# Patient Record
Sex: FEMALE | Race: WHITE | NOT HISPANIC OR LATINO
[De-identification: names, ages, dates, MRNs, and addresses within clinical notes are randomized per-mention and may not be internally consistent; named-entity substitution may affect disease eponyms.]

---

## 2022-06-28 PROBLEM — Z00.00 ENCOUNTER FOR PREVENTIVE HEALTH EXAMINATION: Status: ACTIVE | Noted: 2022-06-28

## 2022-07-15 ENCOUNTER — APPOINTMENT (OUTPATIENT)
Dept: MATERNAL FETAL MEDICINE | Facility: CLINIC | Age: 37
End: 2022-07-15

## 2022-07-28 ENCOUNTER — APPOINTMENT (OUTPATIENT)
Dept: MATERNAL FETAL MEDICINE | Facility: CLINIC | Age: 37
End: 2022-07-28

## 2022-07-28 ENCOUNTER — ASOB RESULT (OUTPATIENT)
Age: 37
End: 2022-07-28

## 2022-07-28 PROCEDURE — 99203 OFFICE O/P NEW LOW 30 MIN: CPT | Mod: 95

## 2022-07-29 ENCOUNTER — APPOINTMENT (OUTPATIENT)
Dept: ANTEPARTUM | Facility: CLINIC | Age: 37
End: 2022-07-29

## 2022-07-29 ENCOUNTER — ASOB RESULT (OUTPATIENT)
Age: 37
End: 2022-07-29

## 2022-07-29 PROCEDURE — 76811 OB US DETAILED SNGL FETUS: CPT

## 2022-08-29 ENCOUNTER — ASOB RESULT (OUTPATIENT)
Age: 37
End: 2022-08-29

## 2022-08-29 ENCOUNTER — APPOINTMENT (OUTPATIENT)
Dept: ANTEPARTUM | Facility: CLINIC | Age: 37
End: 2022-08-29

## 2022-08-29 PROCEDURE — 76819 FETAL BIOPHYS PROFIL W/O NST: CPT

## 2022-08-29 PROCEDURE — 76816 OB US FOLLOW-UP PER FETUS: CPT

## 2022-09-12 ENCOUNTER — APPOINTMENT (OUTPATIENT)
Dept: ANTEPARTUM | Facility: CLINIC | Age: 37
End: 2022-09-12

## 2022-09-16 ENCOUNTER — ASOB RESULT (OUTPATIENT)
Age: 37
End: 2022-09-16

## 2022-09-16 ENCOUNTER — APPOINTMENT (OUTPATIENT)
Dept: ANTEPARTUM | Facility: CLINIC | Age: 37
End: 2022-09-16

## 2022-09-16 PROCEDURE — 76816 OB US FOLLOW-UP PER FETUS: CPT

## 2022-09-16 PROCEDURE — 76819 FETAL BIOPHYS PROFIL W/O NST: CPT | Mod: 59

## 2022-10-14 ENCOUNTER — ASOB RESULT (OUTPATIENT)
Age: 37
End: 2022-10-14

## 2022-10-14 ENCOUNTER — APPOINTMENT (OUTPATIENT)
Dept: ANTEPARTUM | Facility: CLINIC | Age: 37
End: 2022-10-14

## 2022-10-14 PROCEDURE — 76816 OB US FOLLOW-UP PER FETUS: CPT

## 2022-11-08 ENCOUNTER — APPOINTMENT (OUTPATIENT)
Dept: ANTEPARTUM | Facility: CLINIC | Age: 37
End: 2022-11-08

## 2022-11-08 ENCOUNTER — ASOB RESULT (OUTPATIENT)
Age: 37
End: 2022-11-08

## 2022-11-08 PROCEDURE — 76816 OB US FOLLOW-UP PER FETUS: CPT

## 2022-11-08 PROCEDURE — 76819 FETAL BIOPHYS PROFIL W/O NST: CPT | Mod: 59

## 2022-11-11 ENCOUNTER — ASOB RESULT (OUTPATIENT)
Age: 37
End: 2022-11-11

## 2022-11-11 ENCOUNTER — APPOINTMENT (OUTPATIENT)
Dept: ANTEPARTUM | Facility: CLINIC | Age: 37
End: 2022-11-11

## 2022-11-11 ENCOUNTER — INPATIENT (INPATIENT)
Facility: HOSPITAL | Age: 37
LOS: 2 days | Discharge: HOME | End: 2022-11-14
Attending: SPECIALIST | Admitting: SPECIALIST

## 2022-11-11 VITALS
TEMPERATURE: 99 F | HEART RATE: 121 BPM | SYSTOLIC BLOOD PRESSURE: 144 MMHG | DIASTOLIC BLOOD PRESSURE: 83 MMHG | RESPIRATION RATE: 16 BRPM

## 2022-11-11 DIAGNOSIS — O41.00X0 OLIGOHYDRAMNIOS, UNSPECIFIED TRIMESTER, NOT APPLICABLE OR UNSPECIFIED: ICD-10-CM

## 2022-11-11 LAB
ALBUMIN SERPL ELPH-MCNC: 3.5 G/DL — SIGNIFICANT CHANGE UP (ref 3.3–5)
ALP SERPL-CCNC: 136 U/L — HIGH (ref 40–120)
ALT FLD-CCNC: 13 U/L — SIGNIFICANT CHANGE UP (ref 4–33)
ANION GAP SERPL CALC-SCNC: 13 MMOL/L — SIGNIFICANT CHANGE UP (ref 7–14)
APPEARANCE UR: CLEAR — SIGNIFICANT CHANGE UP
APTT BLD: 25.9 SEC — LOW (ref 27–36.3)
AST SERPL-CCNC: 18 U/L — SIGNIFICANT CHANGE UP (ref 4–32)
BASOPHILS # BLD AUTO: 0.04 K/UL — SIGNIFICANT CHANGE UP (ref 0–0.2)
BASOPHILS NFR BLD AUTO: 0.3 % — SIGNIFICANT CHANGE UP (ref 0–2)
BILIRUB SERPL-MCNC: <0.2 MG/DL — SIGNIFICANT CHANGE UP (ref 0.2–1.2)
BILIRUB UR-MCNC: NEGATIVE — SIGNIFICANT CHANGE UP
BLD GP AB SCN SERPL QL: NEGATIVE — SIGNIFICANT CHANGE UP
BUN SERPL-MCNC: 11 MG/DL — SIGNIFICANT CHANGE UP (ref 7–23)
CALCIUM SERPL-MCNC: 10 MG/DL — SIGNIFICANT CHANGE UP (ref 8.4–10.5)
CHLORIDE SERPL-SCNC: 103 MMOL/L — SIGNIFICANT CHANGE UP (ref 98–107)
CO2 SERPL-SCNC: 20 MMOL/L — LOW (ref 22–31)
COLOR SPEC: YELLOW — SIGNIFICANT CHANGE UP
CREAT ?TM UR-MCNC: 88 MG/DL — SIGNIFICANT CHANGE UP
CREAT SERPL-MCNC: 0.48 MG/DL — LOW (ref 0.5–1.3)
DIFF PNL FLD: ABNORMAL
EGFR: 127 ML/MIN/1.73M2 — SIGNIFICANT CHANGE UP
EOSINOPHIL # BLD AUTO: 0.05 K/UL — SIGNIFICANT CHANGE UP (ref 0–0.5)
EOSINOPHIL NFR BLD AUTO: 0.4 % — SIGNIFICANT CHANGE UP (ref 0–6)
GLUCOSE SERPL-MCNC: 112 MG/DL — HIGH (ref 70–99)
GLUCOSE UR QL: NEGATIVE — SIGNIFICANT CHANGE UP
HCT VFR BLD CALC: 36.1 % — SIGNIFICANT CHANGE UP (ref 34.5–45)
HGB BLD-MCNC: 12.1 G/DL — SIGNIFICANT CHANGE UP (ref 11.5–15.5)
IANC: 10.73 K/UL — HIGH (ref 1.8–7.4)
IMM GRANULOCYTES NFR BLD AUTO: 2.1 % — HIGH (ref 0–0.9)
INR BLD: 0.91 RATIO — SIGNIFICANT CHANGE UP (ref 0.88–1.16)
KETONES UR-MCNC: NEGATIVE — SIGNIFICANT CHANGE UP
LDH SERPL L TO P-CCNC: 156 U/L — SIGNIFICANT CHANGE UP (ref 135–225)
LEUKOCYTE ESTERASE UR-ACNC: NEGATIVE — SIGNIFICANT CHANGE UP
LYMPHOCYTES # BLD AUTO: 1.3 K/UL — SIGNIFICANT CHANGE UP (ref 1–3.3)
LYMPHOCYTES # BLD AUTO: 9.8 % — LOW (ref 13–44)
MCHC RBC-ENTMCNC: 32.1 PG — SIGNIFICANT CHANGE UP (ref 27–34)
MCHC RBC-ENTMCNC: 33.5 GM/DL — SIGNIFICANT CHANGE UP (ref 32–36)
MCV RBC AUTO: 95.8 FL — SIGNIFICANT CHANGE UP (ref 80–100)
MONOCYTES # BLD AUTO: 0.84 K/UL — SIGNIFICANT CHANGE UP (ref 0–0.9)
MONOCYTES NFR BLD AUTO: 6.3 % — SIGNIFICANT CHANGE UP (ref 2–14)
NEUTROPHILS # BLD AUTO: 10.73 K/UL — HIGH (ref 1.8–7.4)
NEUTROPHILS NFR BLD AUTO: 81.1 % — HIGH (ref 43–77)
NITRITE UR-MCNC: NEGATIVE — SIGNIFICANT CHANGE UP
NRBC # BLD: 0 /100 WBCS — SIGNIFICANT CHANGE UP (ref 0–0)
NRBC # FLD: 0 K/UL — SIGNIFICANT CHANGE UP (ref 0–0)
PH UR: 6 — SIGNIFICANT CHANGE UP (ref 5–8)
PLATELET # BLD AUTO: 203 K/UL — SIGNIFICANT CHANGE UP (ref 150–400)
POTASSIUM SERPL-MCNC: 3.8 MMOL/L — SIGNIFICANT CHANGE UP (ref 3.5–5.3)
POTASSIUM SERPL-SCNC: 3.8 MMOL/L — SIGNIFICANT CHANGE UP (ref 3.5–5.3)
PROT ?TM UR-MCNC: 20 MG/DL — SIGNIFICANT CHANGE UP
PROT SERPL-MCNC: 6.5 G/DL — SIGNIFICANT CHANGE UP (ref 6–8.3)
PROT UR-MCNC: ABNORMAL
PROT/CREAT UR-RTO: 0.2 RATIO — SIGNIFICANT CHANGE UP (ref 0–0.2)
PROTHROM AB SERPL-ACNC: 10.6 SEC — SIGNIFICANT CHANGE UP (ref 10.5–13.4)
RBC # BLD: 3.77 M/UL — LOW (ref 3.8–5.2)
RBC # FLD: 13.6 % — SIGNIFICANT CHANGE UP (ref 10.3–14.5)
RH IG SCN BLD-IMP: POSITIVE — SIGNIFICANT CHANGE UP
RH IG SCN BLD-IMP: POSITIVE — SIGNIFICANT CHANGE UP
SODIUM SERPL-SCNC: 136 MMOL/L — SIGNIFICANT CHANGE UP (ref 135–145)
SP GR SPEC: 1.02 — SIGNIFICANT CHANGE UP (ref 1.01–1.05)
URATE SERPL-MCNC: 4.3 MG/DL — SIGNIFICANT CHANGE UP (ref 2.5–7)
UROBILINOGEN FLD QL: SIGNIFICANT CHANGE UP
WBC # BLD: 13.24 K/UL — HIGH (ref 3.8–10.5)
WBC # FLD AUTO: 13.24 K/UL — HIGH (ref 3.8–10.5)

## 2022-11-11 PROCEDURE — 76818 FETAL BIOPHYS PROFILE W/NST: CPT

## 2022-11-11 RX ORDER — CITRIC ACID/SODIUM CITRATE 300-500 MG
15 SOLUTION, ORAL ORAL EVERY 6 HOURS
Refills: 0 | Status: DISCONTINUED | OUTPATIENT
Start: 2022-11-11 | End: 2022-11-12

## 2022-11-11 RX ORDER — SODIUM CHLORIDE 9 MG/ML
1000 INJECTION, SOLUTION INTRAVENOUS
Refills: 0 | Status: DISCONTINUED | OUTPATIENT
Start: 2022-11-11 | End: 2022-11-12

## 2022-11-11 RX ORDER — INFLUENZA VIRUS VACCINE 15; 15; 15; 15 UG/.5ML; UG/.5ML; UG/.5ML; UG/.5ML
0.5 SUSPENSION INTRAMUSCULAR ONCE
Refills: 0 | Status: DISCONTINUED | OUTPATIENT
Start: 2022-11-11 | End: 2022-11-14

## 2022-11-11 RX ORDER — CHLORHEXIDINE GLUCONATE 213 G/1000ML
1 SOLUTION TOPICAL ONCE
Refills: 0 | Status: COMPLETED | OUTPATIENT
Start: 2022-11-11 | End: 2022-11-11

## 2022-11-11 RX ORDER — OXYTOCIN 10 UNIT/ML
333.33 VIAL (ML) INJECTION
Qty: 20 | Refills: 0 | Status: DISCONTINUED | OUTPATIENT
Start: 2022-11-11 | End: 2022-11-12

## 2022-11-11 RX ADMIN — CHLORHEXIDINE GLUCONATE 1 APPLICATION(S): 213 SOLUTION TOPICAL at 21:06

## 2022-11-11 RX ADMIN — SODIUM CHLORIDE 125 MILLILITER(S): 9 INJECTION, SOLUTION INTRAVENOUS at 22:38

## 2022-11-11 NOTE — OB RN PATIENT PROFILE - NS_ACCOMPAINEDBY_OBGYN_ALL_OB
pts  calling speaking for his wife who speaks [place]. Anabelle Arevalo mentioned his wife has been experiencing some discomfort in the vaginal area and wants an appt today with CNM. He mentioned his wife believes one of the stiches may have came out and they would like to check this out today.  Patient was offered today with Berto Leary at 59P Spouse

## 2022-11-11 NOTE — OB RN PATIENT PROFILE - GRAVIDA, OB PROFILE
Nutrition Problem #1: Swallowing difficulty  Intervention: Food and/or Nutrient Delivery: Continue Current Diet  Nutritional Goals: patient will consume > 75% of his meals on general diet order x 3 meals per day without s/s of aspiration
Problem: Falls - Risk of:  Goal: Will remain free from falls  Description: Will remain free from falls  Outcome: Ongoing     Problem: Falls - Risk of:  Goal: Absence of physical injury  Description: Absence of physical injury  Outcome: Ongoing       Problem: Safety:  Goal: Free from accidental physical injury  Description: Free from accidental physical injury  Outcome: Ongoing     Problem: Daily Care:  Goal: Daily care needs are met  Description: Daily care needs are met  Outcome: Ongoing     Problem: Pain:  Goal: Patient's pain/discomfort is manageable  Description: Patient's pain/discomfort is manageable  Outcome: Ongoing
Problem: Falls - Risk of:  Goal: Will remain free from falls  Description: Will remain free from falls  Outcome: Ongoing  Goal: Absence of physical injury  Description: Absence of physical injury  Outcome: Ongoing     Problem: Infection:  Goal: Will remain free from infection  Description: Will remain free from infection  Outcome: Ongoing     Problem: Safety:  Goal: Free from accidental physical injury  Description: Free from accidental physical injury  Outcome: Ongoing  Goal: Free from intentional harm  Description: Free from intentional harm  Outcome: Ongoing     Problem: Daily Care:  Goal: Daily care needs are met  Description: Daily care needs are met  Outcome: Ongoing     Problem: Pain:  Goal: Patient's pain/discomfort is manageable  Description: Patient's pain/discomfort is manageable  Outcome: Ongoing     Problem: Skin Integrity:  Goal: Skin integrity will stabilize  Description: Skin integrity will stabilize  Outcome: Ongoing     Problem: Discharge Planning:  Goal: Patients continuum of care needs are met  Description: Patients continuum of care needs are met  Outcome: Ongoing
Problem: Falls - Risk of:  Goal: Will remain free from falls  Description: Will remain free from falls  Outcome: Ongoing  Room free of clutter. Problem: Infection:  Goal: Will remain free from infection  Description: Will remain free from infection  Outcome: Ongoing     Pt remains temp free this shift.
Problem: Nutrition  Intervention: Swallowing evaluation  Note: Bedside swallow evaluation completed this date. Elsa Galeas M.S. Leyla Chaudhari  Speech-language pathologist  [id number]      Intervention: Aspiration precautions  Note: Bedside swallow evaluation completed this date.     Elsa Galeas M.S. Leyla Chaudhari  Speech-language pathologist  [id number]
1

## 2022-11-11 NOTE — OB RN TRIAGE NOTE - FALL HARM RISK - UNIVERSAL INTERVENTIONS
Bed in lowest position, wheels locked, appropriate side rails in place/Call bell, personal items and telephone in reach/Instruct patient to call for assistance before getting out of bed or chair/Non-slip footwear when patient is out of bed/[place] to call system/Physically safe environment - no spills, clutter or unnecessary equipment/Purposeful Proactive Rounding/Room/bathroom lighting operational, light cord in reach

## 2022-11-11 NOTE — OB PROVIDER H&P - ASSESSMENT
A/P: Pt is a 37yo  @40+2 who presents for IOL for oligohydramnios (DAMIEN 5).   - GBS negative, CMV positive - declined amnio     1. Admit to LND. Routine Labs. IVF  2. IOL with PO cytotec   3. Fetus: Cat I tracing, Vertex, EFW 3125g. C/w EFM.  4. 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 GBS negative   6. Pain: IV pain meds/epidural PRN    Rea Gaxiola, PGY2  d/w Dr. White

## 2022-11-11 NOTE — OB PROVIDER H&P - NSMATERNALFETALCONCERNS_OBGYN_ALL_OB_FT
FETAL ALERT  AMA   S/P MFM consult secondary to CMV positivity - declined amnio   M recommends Peds be alerted secondary to need for appropriate  testing   Kimberly Pickering RN 2022

## 2022-11-11 NOTE — OB PROVIDER H&P - NSHPLABSRESULTS_GEN_ALL_CORE
Vital Signs Last 24 Hrs  T(C): 37.0 (11 Nov 2022 20:44), Max: 37.4 (11 Nov 2022 20:09)  T(F): 98.6 (11 Nov 2022 20:44), Max: 99.3 (11 Nov 2022 20:09)  HR: 103 (11 Nov 2022 21:29) (103 - 121)  BP: 114/66 (11 Nov 2022 21:29) (114/66 - 144/83)  BP(mean): --  RR: 16 (11 Nov 2022 20:09) (16 - 16)  SpO2: --        Physical Exam:  Gen: NAD, AOx3  CV: RR  Resp: unlabored respirations  Abd: soft, NT, ND    Speculum Exam: no lesions     SVE: 1/60/-3  FHT: 140s moderate variability, accels, no decels   [place]: uterine irritaiblity   EFW: 3125g  Sono: vertex

## 2022-11-11 NOTE — OB PROVIDER H&P - HISTORY OF PRESENT ILLNESS
HPI: Pt is a 37 yo GP10 @40+2  presenting for IOL for oligohydramnios DAMIEN 5. Pt endorsing fetal movement, denies loss of fluid, denies contractions, denies vaginal bleeding.     Prenatal Issues: CMV positive - declined amnio   GBS: negative   EFW: 7#    OBHx:  G1 current     Gyn Hx:  - HSV positive on blood testing     PMH: denies     SHx:denies     Psych: denies     Social: denies     Medications: PNV, Valtrex     Allergies: NKDA     Will Accept blood transfusion? yes

## 2022-11-12 ENCOUNTER — TRANSCRIPTION ENCOUNTER (OUTPATIENT)
Age: 37
End: 2022-11-12

## 2022-11-12 LAB
COVID-19 SPIKE DOMAIN AB INTERP: POSITIVE
COVID-19 SPIKE DOMAIN ANTIBODY RESULT: >250 U/ML — HIGH
SARS-COV-2 IGG+IGM SERPL QL IA: >250 U/ML — HIGH
SARS-COV-2 IGG+IGM SERPL QL IA: POSITIVE
SARS-COV-2 RNA SPEC QL NAA+PROBE: SIGNIFICANT CHANGE UP
T PALLIDUM AB TITR SER: NEGATIVE — SIGNIFICANT CHANGE UP

## 2022-11-12 RX ORDER — OXYCODONE HYDROCHLORIDE 5 MG/1
5 TABLET ORAL ONCE
Refills: 0 | Status: DISCONTINUED | OUTPATIENT
Start: 2022-11-12 | End: 2022-11-14

## 2022-11-12 RX ORDER — HYDROCORTISONE 1 %
1 OINTMENT (GRAM) TOPICAL EVERY 6 HOURS
Refills: 0 | Status: DISCONTINUED | OUTPATIENT
Start: 2022-11-12 | End: 2022-11-14

## 2022-11-12 RX ORDER — PRAMOXINE HYDROCHLORIDE 150 MG/15G
1 AEROSOL, FOAM RECTAL EVERY 4 HOURS
Refills: 0 | Status: DISCONTINUED | OUTPATIENT
Start: 2022-11-12 | End: 2022-11-14

## 2022-11-12 RX ORDER — BENZOCAINE 10 %
1 GEL (GRAM) MUCOUS MEMBRANE EVERY 6 HOURS
Refills: 0 | Status: DISCONTINUED | OUTPATIENT
Start: 2022-11-12 | End: 2022-11-14

## 2022-11-12 RX ORDER — KETOROLAC TROMETHAMINE 30 MG/ML
30 SYRINGE (ML) INJECTION ONCE
Refills: 0 | Status: DISCONTINUED | OUTPATIENT
Start: 2022-11-12 | End: 2022-11-14

## 2022-11-12 RX ORDER — DIPHENHYDRAMINE HCL 50 MG
25 CAPSULE ORAL EVERY 6 HOURS
Refills: 0 | Status: DISCONTINUED | OUTPATIENT
Start: 2022-11-12 | End: 2022-11-14

## 2022-11-12 RX ORDER — OXYTOCIN 10 UNIT/ML
333.33 VIAL (ML) INJECTION
Qty: 20 | Refills: 0 | Status: DISCONTINUED | OUTPATIENT
Start: 2022-11-12 | End: 2022-11-12

## 2022-11-12 RX ORDER — AER TRAVELER 0.5 G/1
1 SOLUTION RECTAL; TOPICAL EVERY 4 HOURS
Refills: 0 | Status: DISCONTINUED | OUTPATIENT
Start: 2022-11-12 | End: 2022-11-14

## 2022-11-12 RX ORDER — SIMETHICONE 80 MG/1
80 TABLET, CHEWABLE ORAL EVERY 4 HOURS
Refills: 0 | Status: DISCONTINUED | OUTPATIENT
Start: 2022-11-12 | End: 2022-11-14

## 2022-11-12 RX ORDER — ACETAMINOPHEN 500 MG
975 TABLET ORAL
Refills: 0 | Status: DISCONTINUED | OUTPATIENT
Start: 2022-11-12 | End: 2022-11-14

## 2022-11-12 RX ORDER — LANOLIN
1 OINTMENT (GRAM) TOPICAL EVERY 6 HOURS
Refills: 0 | Status: DISCONTINUED | OUTPATIENT
Start: 2022-11-12 | End: 2022-11-14

## 2022-11-12 RX ORDER — VALACYCLOVIR 500 MG/1
0 TABLET, FILM COATED ORAL
Qty: 0 | Refills: 0 | DISCHARGE

## 2022-11-12 RX ORDER — DIBUCAINE 1 %
1 OINTMENT (GRAM) RECTAL EVERY 6 HOURS
Refills: 0 | Status: DISCONTINUED | OUTPATIENT
Start: 2022-11-12 | End: 2022-11-14

## 2022-11-12 RX ORDER — TETANUS TOXOID, REDUCED DIPHTHERIA TOXOID AND ACELLULAR PERTUSSIS VACCINE, ADSORBED 5; 2.5; 8; 8; 2.5 [IU]/.5ML; [IU]/.5ML; UG/.5ML; UG/.5ML; UG/.5ML
0.5 SUSPENSION INTRAMUSCULAR ONCE
Refills: 0 | Status: DISCONTINUED | OUTPATIENT
Start: 2022-11-12 | End: 2022-11-14

## 2022-11-12 RX ORDER — IBUPROFEN 200 MG
600 TABLET ORAL EVERY 6 HOURS
Refills: 0 | Status: COMPLETED | OUTPATIENT
Start: 2022-11-12 | End: 2023-10-11

## 2022-11-12 RX ORDER — ACETAMINOPHEN 500 MG
975 TABLET ORAL ONCE
Refills: 0 | Status: COMPLETED | OUTPATIENT
Start: 2022-11-12 | End: 2022-11-12

## 2022-11-12 RX ORDER — MAGNESIUM HYDROXIDE 400 MG/1
30 TABLET, CHEWABLE ORAL
Refills: 0 | Status: DISCONTINUED | OUTPATIENT
Start: 2022-11-12 | End: 2022-11-14

## 2022-11-12 RX ORDER — OXYTOCIN 10 UNIT/ML
VIAL (ML) INJECTION
Qty: 30 | Refills: 0 | Status: DISCONTINUED | OUTPATIENT
Start: 2022-11-12 | End: 2022-11-12

## 2022-11-12 RX ORDER — OXYCODONE HYDROCHLORIDE 5 MG/1
5 TABLET ORAL
Refills: 0 | Status: DISCONTINUED | OUTPATIENT
Start: 2022-11-12 | End: 2022-11-14

## 2022-11-12 RX ORDER — SODIUM CHLORIDE 9 MG/ML
3 INJECTION INTRAMUSCULAR; INTRAVENOUS; SUBCUTANEOUS EVERY 8 HOURS
Refills: 0 | Status: DISCONTINUED | OUTPATIENT
Start: 2022-11-12 | End: 2022-11-14

## 2022-11-12 RX ORDER — IBUPROFEN 200 MG
600 TABLET ORAL EVERY 6 HOURS
Refills: 0 | Status: DISCONTINUED | OUTPATIENT
Start: 2022-11-12 | End: 2022-11-14

## 2022-11-12 RX ADMIN — Medication 2 MILLIUNIT(S)/MIN: at 08:18

## 2022-11-12 RX ADMIN — Medication 975 MILLIGRAM(S): at 14:43

## 2022-11-12 RX ADMIN — Medication 1000 MILLIUNIT(S)/MIN: at 14:43

## 2022-11-12 RX ADMIN — Medication 975 MILLIGRAM(S): at 12:10

## 2022-11-12 NOTE — OB RN DELIVERY SUMMARY - NSSELHIDDEN_OBGYN_ALL_OB_FT
[NS_DeliveryAttending1_OBGYN_ALL_OB_FT:[id number]=],[NS_DeliveryAssist1_OBGYN_ALL_OB_FT:[id number]=],[NS_DeliveryRN_OBGYN_ALL_OB_FT:[id number]] Yes

## 2022-11-12 NOTE — OB NEONATOLOGY/PEDIATRICIAN DELIVERY SUMMARY - NSPEDSNEONOTESA_OBGYN_ALL_OB_FT
Peds called to delivery for category II FHT. 40.3 wk male born via VAVD to a 37 y/o  blood type A+ mother. Maternal history of HSV on valtrex, gHTN, CMV+ in pregnancy. Prenatal history of oligo, MFM reports no sonographic evidence of CMV. PNL -/-/NR/I, GBS - on 10/19. ROM at ~0745 with meconium fluids, approx. 6 hrs. Baby emerged stunned was w/d/s/s. Code 100 called at 1 MOL for poor cry. NICU resuscitation team arrived by 2.5MOL. Deep suctioned and CPAP max 5/30% x6 minutes with APGARS of 6/9. Mom plans to breast feed, declines Hep B vaccine and consents circ. EOS 0.79. COVID negative.  TOB: 1347  BW: 3425

## 2022-11-12 NOTE — OB RN DELIVERY SUMMARY - NS_SEPSISRSKCALC_OBGYN_ALL_OB_FT
EOS calculated successfully. EOS Risk Factor: 0.5/1000 live births ([facility][note 3] national incidence); GA=40w3d; Temp=100.76; ROM=6.017; GBS='Negative'; Antibiotics='No antibiotics or any antibiotics < 2 hrs prior to birth'

## 2022-11-12 NOTE — OB PROVIDER DELIVERY SUMMARY - NSPROVIDERDELIVERYNOTE_OBGYN_ALL_OB_FT
Patient fully dilated and pushing. Cat 2 FHT noted with pushing with good variability and return to baseline between contractions. Patient counseled on need for expedited delivery due to FHT. Verbal consent for vacuum assisted delivery obtained from patient and partner. Kiwi applied at +2 station. With 2 pulls and 0 pop offs, successful delivery of liveborn male infant from compound NEW position. Head, shoulders, and body delivered easily. Infant was suctioned. Thick mec noted at delivery. Cord clamped and cut and infant handed to mother and then awaiting pediatricians. Placenta delivered intact with a 3 vessel cord. Fundal massage was given and uterine fundus was found to be firm. Vaginal exam revealed an intact cervix, vaginal walls and sulci. Patient had a 1st degree laceration in the perineum that was repaired with 2-0 chromic suture. Excellent hemostasis was noted. Patient was stable and went to recovery. Count was correct x 2.

## 2022-11-12 NOTE — CHART NOTE - NSCHARTNOTEFT_GEN_A_CORE
Patient evaluated with her Nurse.   Will continue with Pitocin  for now.   Will re-evaluate as needed.   D/W Patient & partner about Category 2 tracing.

## 2022-11-12 NOTE — OB PROVIDER DELIVERY SUMMARY - NSSELHIDDEN_OBGYN_ALL_OB_FT
[NS_DeliveryAttending1_OBGYN_ALL_OB_FT:[id number]],[NS_DeliveryAssist1_OBGYN_ALL_OB_FT:[id number]=]

## 2022-11-12 NOTE — OB PROVIDER LABOR PROGRESS NOTE - NS_SUBJECTIVE/OBJECTIVE_OBGYN_ALL_OB_FT
PT checked for insertion of cervical balloon
R3 Progress Note    Patient examined s/p CB expelled and SROM. Fluid appears blood stained in setting of bloody show from balloon, will monitor.

## 2022-11-12 NOTE — DISCHARGE NOTE OB - NS MD DC FALL RISK RISK
For information on Fall & Injury Prevention, visit: [email or web address] OR  [email or web address] OR  https://www.cdc.gov/steadi/patient.html

## 2022-11-12 NOTE — DISCHARGE NOTE OB - PATIENT PORTAL LINK FT
You can access the FollowMyHealth Patient Portal offered by [facility] by registering at the following website: [email or web address]. By joining [facility]’s FollowMyHealth portal, you will also be able to view your health information using other applications (apps) compatible with our system.

## 2022-11-12 NOTE — OB PROVIDER LABOR PROGRESS NOTE - ASSESSMENT
IOL for oligo  - CB placed @330a  - Continue PO    Maine Cardoso, PGY1  As per plan previously d/w Dr. White
Plan   Transition from cytotec to pitocin   Cont EFM/[place]  Resuscitative measures PRN with repositioning   Anticipate      Jennifer Aguilera MD PGY3   d/w Dr. Richards

## 2022-11-12 NOTE — CHART NOTE - NSCHARTNOTEFT_GEN_A_CORE
R3 BP Review    HELLP labs sent due to labile blood pressures    HELLP labs reviewed:  H/H: 12.1/36.1  Plt: 203  Cr: 0.48  AST/ALT: 18/13    P/C: 0.2    Pt denies headaches, blurred vision, RUQ pain.    Based on BPs and lab wnl, pt meets criteria for gHTN    Will continue close BP monitoring    SReynolds PGY3

## 2022-11-12 NOTE — DISCHARGE NOTE OB - CARE PROVIDER_API CALL
Kailash Fragoso)  Obstetrics and Gynecology  [address] [place] [address]  Phone: [phone]  Fax: [phone]  Follow Up Time:

## 2022-11-12 NOTE — OB RN DELIVERY SUMMARY - NS_LABORCHARACTER_OBGYN_ALL_OB
Induction of labor-Medicinal/Augmentation of labor/Febrile (>38C)/External electronic FM/Meconium staining

## 2022-11-13 RX ADMIN — SODIUM CHLORIDE 3 MILLILITER(S): 9 INJECTION INTRAMUSCULAR; INTRAVENOUS; SUBCUTANEOUS at 12:27

## 2022-11-13 RX ADMIN — Medication 600 MILLIGRAM(S): at 12:40

## 2022-11-13 RX ADMIN — Medication 600 MILLIGRAM(S): at 05:58

## 2022-11-13 RX ADMIN — Medication 1 TABLET(S): at 11:43

## 2022-11-13 RX ADMIN — Medication 600 MILLIGRAM(S): at 11:43

## 2022-11-14 VITALS
HEART RATE: 85 BPM | DIASTOLIC BLOOD PRESSURE: 78 MMHG | TEMPERATURE: 98 F | RESPIRATION RATE: 18 BRPM | SYSTOLIC BLOOD PRESSURE: 129 MMHG | OXYGEN SATURATION: 100 %

## 2022-11-14 RX ADMIN — Medication 600 MILLIGRAM(S): at 07:29

## 2022-11-14 RX ADMIN — Medication 600 MILLIGRAM(S): at 08:30

## 2022-11-14 NOTE — PROGRESS NOTE ADULT - SUBJECTIVE AND OBJECTIVE BOX
OB Progress Note: [facility] PPD #2    S: 37y/o  PPD#2 from [facility] with 1* in stable condition, c/b gHTN. Patient feels well. Pain is well controlled. She is tolerating a regular diet and passing flatus. She is voiding spontaneously, and ambulating without difficulty. Denies Nausea/vomiting/CP/SOB/lightheadedness/dizziness/headache/epigastric pain/changes in vision.    O:  Vitals:  Vital Signs Last 24 Hrs  T(C): 36.9 (2022 05:00), Max: 37.1 (2022 21:35)  T(F): 98.4 (2022 05:00), Max: 98.7 (2022 21:35)  HR: 87 (2022 05:00) (87 - 109)  BP: 129/75 (2022 05:00) (118/64 - 138/84)  BP(mean): --  RR: 18 (2022 05:00) (18 - 18)  SpO2: 100% (2022 05:00) (98% - 100%)    Parameters below as of 2022 05:00  Patient On (Oxygen Delivery Method): room air        MEDICATIONS  (STANDING):  acetaminophen     Tablet .. 975 milliGRAM(s) Oral <User Schedule>  diphtheria/tetanus/pertussis (acellular) Vaccine (Adacel) 0.5 milliLiter(s) IntraMuscular once  ibuprofen  Tablet. 600 milliGRAM(s) Oral every 6 hours  influenza   Vaccine 0.5 milliLiter(s) IntraMuscular once  ketorolac   Injectable 30 milliGRAM(s) IV Push once  prenatal multivitamin 1 Tablet(s) Oral daily  sodium chloride 0.9% lock flush 3 milliLiter(s) IV Push every 8 hours      Labs:  Blood type: A Positive  Rubella IgG: RPR: Negative                          12.1   13.24<H> >-----------< 203    (  @ 22:10 )             36.1    22 @ 22:10      136  |  103  |  11  ----------------------------<  112<H>  3.8   |  20<L>  |  0.48<L>        Ca    10.0      2022 22:10    TPro  6.5  /  Alb  3.5  /  TBili  <0.2  /  DBili  x   /  AST  18  /  ALT  13  /  AlkPhos  136<H>  22 @ 22:10          Physical Exam:  General: NAD  Abdomen: Soft, non-tender, non-distended, fundus firm  Vaginal: Lochia wnl  Extremities: No erythema/edema
S: 35yo PPD#1 s/p VAVD, c/b gHTN. PMH: HSV. Patient feels well. Pain is well controlled. She is tolerating a regular diet and passing flatus, no BM yet. She is voiding spontaneously, and ambulating without difficulty. Denies CP/SOB. Denies lightheadedness/dizziness. Denies N/V.        O:  Vitals:  Vital Signs Last 24 Hrs  T(C): 36.9 (2022 05:00), Max: 37.1 (2022 21:35)  T(F): 98.4 (2022 05:00), Max: 98.7 (2022 21:35)  HR: 87 (2022 05:00) (87 - 109)  BP: 129/75 (2022 05:00) (118/64 - 138/84)  BP(mean): --  RR: 18 (2022 05:00) (18 - 18)  SpO2: 100% (2022 05:00) (98% - 100%)    Parameters below as of 2022 05:00  Patient On (Oxygen Delivery Method): room air        MEDICATIONS  (STANDING):  acetaminophen     Tablet .. 975 milliGRAM(s) Oral <User Schedule>  diphtheria/tetanus/pertussis (acellular) Vaccine (Adacel) 0.5 milliLiter(s) IntraMuscular once  ibuprofen  Tablet. 600 milliGRAM(s) Oral every 6 hours  influenza   Vaccine 0.5 milliLiter(s) IntraMuscular once  ketorolac   Injectable 30 milliGRAM(s) IV Push once  prenatal multivitamin 1 Tablet(s) Oral daily  sodium chloride 0.9% lock flush 3 milliLiter(s) IV Push every 8 hours      Labs:  Blood type: A Positive  Rubella IgG: RPR: Negative                          12.1   13.24<H> >-----------< 203    (  @ 22:10 )             36.1    22 @ 22:10      136  |  103  |  11  ----------------------------<  112<H>  3.8   |  20<L>  |  0.48<L>        Ca    10.0      2022 22:10    TPro  6.5  /  Alb  3.5  /  TBili  <0.2  /  DBili  x   /  AST  18  /  ALT  13  /  AlkPhos  136<H>  22 @ 22:10          Physical Exam:  General: NAD  Abdomen: soft, non-tender, non-distended, fundus firm  Vaginal: Lochia wnl  Extremities: No erythema/edema    A/P: 35yo PPD#1 s/p  c/b gHTN.  Patient is stable and doing well post-partum.     #gHTN   -Prescription for BP cuff sent to [facility]  --instructions given on BP monitoring; TID; call MD office if greater than 140/90; report to emergency room is greater than 160/110  --reviewed signs and symptoms related to preeclampsia with patient such as HA, Change in vision, N/V. RUQ pain   --keep log BP's to present to MD during appointment or triage. Schedule 1 week BP check with OBGYN  -All questions answered, patient verbalized understanding     #Postpartum  - Pain well controlled, continue current pain regimen  - Increase ambulation, SCDs when not ambulating  - Continue regular diet  - Discharge planning    Irina Shelton FN-BC
S: Patient doing well. Minimal lochia. Pain controlled.    O: Vital Signs Last 24 Hrs  T(C): 37.2 (2022 05:53), Max: 38.2 (2022 11:57)  T(F): 98.9 (2022 05:53), Max: 100.76 (2022 11:57)  HR: 100 (2022 05:53) (80 - 134)  BP: 133/89 (2022 05:53) (103/58 - 146/67)  BP(mean): --  RR: 18 (2022 05:53) (18 - 18)  SpO2: 99% (2022 05:53) (76% - 100%)    Parameters below as of 2022 05:53  Patient On (Oxygen Delivery Method): room air        Gen: NAD  Abd: soft, NT, ND, fundus firm below umbilicus  Lochia: moderate  Ext: no tenderness    Labs:                        12.1   13.24 )-----------( 203      ( 2022 22:10 )             36.1       A: 36y PPD#1 s/p  doing well.    Plan:  continue care  discharge instructions given
R1 Progress Note    Patient seen and examined at bedside, no acute overnight events. No acute complaints, pain well controlled. Patient is ambulating, voiding spontaneously, passing gas, and tolerating regular diet. Denies CP, SOB, N/V, HA, blurred vision, epigastric pain.    Vital Signs Last 24 Hours  T(C): 37.2 (11-13-22 @ 05:53), Max: 38.2 (11-12-22 @ 11:57)  HR: 100 (11-13-22 @ 05:53) (80 - 134)  BP: 133/89 (11-13-22 @ 05:53) (103/58 - 162/74)  RR: 18 (11-13-22 @ 05:53) (18 - 18)  SpO2: 99% (11-13-22 @ 05:53) (76% - 100%)    Physical Exam:  General: NAD  Abdomen: Soft, non-tender, non-distended, fundus firm  Pelvic: Lochia wnl    Labs:    Blood Type: A Positive  Antibody Screen: Negative  RPR: Negative               12.1   13.24 )-----------( 203      ( 11-11 @ 22:10 )             36.1         MEDICATIONS  (STANDING):  acetaminophen     Tablet .. 975 milliGRAM(s) Oral <User Schedule>  diphtheria/tetanus/pertussis (acellular) Vaccine (Adacel) 0.5 milliLiter(s) IntraMuscular once  ibuprofen  Tablet. 600 milliGRAM(s) Oral every 6 hours  influenza   Vaccine 0.5 milliLiter(s) IntraMuscular once  ketorolac   Injectable 30 milliGRAM(s) IV Push once  prenatal multivitamin 1 Tablet(s) Oral daily  sodium chloride 0.9% lock flush 3 milliLiter(s) IV Push every 8 hours    MEDICATIONS  (PRN):  benzocaine 20%/menthol 0.5% Spray 1 Spray(s) Topical every 6 hours PRN for Perineal discomfort  dibucaine 1% Ointment 1 Application(s) Topical every 6 hours PRN Perineal discomfort  diphenhydrAMINE 25 milliGRAM(s) Oral every 6 hours PRN Pruritus  hydrocortisone 1% Cream 1 Application(s) Topical every 6 hours PRN Moderate Pain (4-6)  lanolin Ointment 1 Application(s) Topical every 6 hours PRN nipple soreness  magnesium hydroxide Suspension 30 milliLiter(s) Oral two times a day PRN Constipation  oxyCODONE    IR 5 milliGRAM(s) Oral every 3 hours PRN Moderate to Severe Pain (4-10)  oxyCODONE    IR 5 milliGRAM(s) Oral once PRN Moderate to Severe Pain (4-10)  pramoxine 1%/zinc 5% Cream 1 Application(s) Topical every 4 hours PRN Moderate Pain (4-6)  simethicone 80 milliGRAM(s) Chew every 4 hours PRN Gas  witch hazel Pads 1 Application(s) Topical every 4 hours PRN Perineal discomfort

## 2022-11-14 NOTE — PROGRESS NOTE ADULT - ASSESSMENT
37y/o  PPD#1 from VAVD with 1* in stable condition, c/b gHTN. Overall pt recovering well post-delivery.    #gHTN  - Overnight BPs 120-130/60-70s, WNL  - Pt not requiring antihypertensives at this time  - HELLP labs WNL, P/C 0.2  - Continue to monitor BPs    #Post-delivery state  - Continue with po analgesia  - Increase ambulation  - Continue regular diet  - IV lock  - No labs    Makalya Calvo PGY1
35y/o  PPD#2 from VAVD with 1* in stable condition, c/b gHTN. Overall pt recovering well post-delivery.    #gHTN  - Overnight BPs well controlled  - Pt not requiring antihypertensives at this time  - HELLP labs WNL, P/C 0.2  - Continue to monitor BPs    #Post-delivery state  - Continue with po analgesia  - Increase ambulation  - Continue regular diet  - IV lock  - No labs    Dina Fitzgerald PGY1

## 2022-12-09 ENCOUNTER — NON-APPOINTMENT (OUTPATIENT)
Age: 37
End: 2022-12-09
